# Patient Record
Sex: FEMALE | Race: OTHER | NOT HISPANIC OR LATINO | URBAN - METROPOLITAN AREA
[De-identification: names, ages, dates, MRNs, and addresses within clinical notes are randomized per-mention and may not be internally consistent; named-entity substitution may affect disease eponyms.]

---

## 2017-05-22 ENCOUNTER — INPATIENT (INPATIENT)
Facility: HOSPITAL | Age: 37
LOS: 1 days | Discharge: HOME | End: 2017-05-24
Attending: OBSTETRICS & GYNECOLOGY | Admitting: OBSTETRICS & GYNECOLOGY

## 2017-06-28 DIAGNOSIS — O09.523 SUPERVISION OF ELDERLY MULTIGRAVIDA, THIRD TRIMESTER: ICD-10-CM

## 2017-06-28 DIAGNOSIS — O34.219 MATERNAL CARE FOR UNSPECIFIED TYPE SCAR FROM PREVIOUS CESAREAN DELIVERY: ICD-10-CM

## 2017-06-28 DIAGNOSIS — O48.0 POST-TERM PREGNANCY: ICD-10-CM

## 2017-06-28 DIAGNOSIS — Z3A.41 41 WEEKS GESTATION OF PREGNANCY: ICD-10-CM

## 2023-08-11 ENCOUNTER — APPOINTMENT (OUTPATIENT)
Dept: OBGYN | Facility: CLINIC | Age: 43
End: 2023-08-11
Payer: COMMERCIAL

## 2023-08-11 VITALS
HEIGHT: 62 IN | WEIGHT: 198 LBS | SYSTOLIC BLOOD PRESSURE: 125 MMHG | BODY MASS INDEX: 36.44 KG/M2 | DIASTOLIC BLOOD PRESSURE: 85 MMHG

## 2023-08-11 DIAGNOSIS — Z30.432 ENCOUNTER FOR REMOVAL OF INTRAUTERINE CONTRACEPTIVE DEVICE: ICD-10-CM

## 2023-08-11 DIAGNOSIS — Z01.419 ENCOUNTER FOR GYNECOLOGICAL EXAMINATION (GENERAL) (ROUTINE) W/OUT ABNORMAL FINDINGS: ICD-10-CM

## 2023-08-11 LAB
BILIRUB UR QL STRIP: NORMAL
GLUCOSE UR-MCNC: NORMAL
HCG UR QL: 0.2 EU/DL
HCG UR QL: NEGATIVE
HGB UR QL STRIP.AUTO: NORMAL
KETONES UR-MCNC: NORMAL
LEUKOCYTE ESTERASE UR QL STRIP: NORMAL
NITRITE UR QL STRIP: NORMAL
PH UR STRIP: 5
PROT UR STRIP-MCNC: NORMAL
SP GR UR STRIP: 1.01

## 2023-08-11 PROCEDURE — 81003 URINALYSIS AUTO W/O SCOPE: CPT | Mod: QW

## 2023-08-11 PROCEDURE — 99396 PREV VISIT EST AGE 40-64: CPT | Mod: 25

## 2023-08-11 PROCEDURE — 81025 URINE PREGNANCY TEST: CPT

## 2023-08-11 PROCEDURE — 58301 REMOVE INTRAUTERINE DEVICE: CPT

## 2023-08-11 NOTE — HISTORY OF PRESENT ILLNESS
[FreeTextEntry1] : 41 yo  hx of 1 c/s for twins and then 4  has had Paragard IUD for the past 6 yrs, pt would like to remove it today pt would  like to try to conceive pt has been having heavy periods with the Paragard, cycles have been regular last GYN visit was a number of yrs ago on no meds

## 2023-08-11 NOTE — DISCUSSION/SUMMARY
[FreeTextEntry1] : 41 yo   normal [employer] pap sent Paragard IUD removed, device sent for culture n/v 1 yr or PRN advised folic acid supplementation.

## 2023-08-11 NOTE — PROCEDURE
[IUD Removal] : intrauterine device (IUD) removal [Fertility Desired] : fertility desired [Risks] : risks [Benefits] : benefits [Speculum Placed] : speculum placed [Strings Visualized] : strings visualized [Cultured] : specimen sent for cultures [Tolerated Well] : Patient tolerated the procedure well [No Complications] : no complications

## 2023-08-14 LAB — HPV HIGH+LOW RISK DNA PNL CVX: NOT DETECTED

## 2023-08-18 LAB
ACTINOMYCES CULTURE FOR IUD: NORMAL
CYTOLOGY CVX/VAG DOC THIN PREP: NORMAL